# Patient Record
Sex: FEMALE | URBAN - METROPOLITAN AREA
[De-identification: names, ages, dates, MRNs, and addresses within clinical notes are randomized per-mention and may not be internally consistent; named-entity substitution may affect disease eponyms.]

---

## 2018-11-03 ENCOUNTER — APPOINTMENT (RX ONLY)
Dept: URBAN - METROPOLITAN AREA CLINIC 152 | Facility: CLINIC | Age: 18
Setting detail: DERMATOLOGY
End: 2018-11-03

## 2018-11-03 DIAGNOSIS — L85.3 XEROSIS CUTIS: ICD-10-CM

## 2018-11-03 DIAGNOSIS — L20.89 OTHER ATOPIC DERMATITIS: ICD-10-CM

## 2018-11-03 PROCEDURE — ? COUNSELING

## 2018-11-03 PROCEDURE — ? PRESCRIPTION

## 2018-11-03 PROCEDURE — 99203 OFFICE O/P NEW LOW 30 MIN: CPT

## 2018-11-03 RX ORDER — TACROLIMUS 1 MG/G
OINTMENT TOPICAL
Qty: 1 | Refills: 2 | Status: ERX | COMMUNITY
Start: 2018-11-03

## 2018-11-03 RX ADMIN — TACROLIMUS: 1 OINTMENT TOPICAL at 13:33

## 2018-11-03 ASSESSMENT — LOCATION SIMPLE DESCRIPTION DERM
LOCATION SIMPLE: LEFT POSTERIOR UPPER ARM
LOCATION SIMPLE: RIGHT POSTERIOR UPPER ARM

## 2018-11-03 ASSESSMENT — LOCATION DETAILED DESCRIPTION DERM
LOCATION DETAILED: LEFT PROXIMAL POSTERIOR UPPER ARM
LOCATION DETAILED: RIGHT PROXIMAL POSTERIOR UPPER ARM

## 2018-11-03 ASSESSMENT — LOCATION ZONE DERM: LOCATION ZONE: ARM

## 2018-11-03 NOTE — PROCEDURE: COUNSELING
Patient Specific Counseling (Will Not Stick From Patient To Patient): Discussed option of treating with a steroid when active and a non-steroidal for maintenance.\\nSamples of Halog ointment given to use for one week, then switch to Aquaphor or Vaseline.
Detail Level: Detailed
Patient Specific Counseling (Will Not Stick From Patient To Patient): Recommended applying thick moisturizer after showering.
Detail Level: Generalized